# Patient Record
(demographics unavailable — no encounter records)

---

## 2024-11-15 NOTE — PHYSICAL EXAM
[de-identified] : The patient is a well appearing 17 year old male of their stated age. Patient ambulates with a normal gait.  Negative straight leg raise.   Surgical site: LEFT KNEE   Incision sites: Well healed, clean, dry, intact, without drainage, without erythema  Range of motion: 0-140 degrees   Motor Testin+/5 quadriceps strength   Stability Testing: Stable ligamentous examination    Swelling/Effusion: None    Tenderness to palpation: None   Provocative testing: -LM/AD   Right Calf: soft and nontender Left Calf: soft and nontender   Neurovascular Examination: Grossly intact, 2+ distal pulses Contralateral Extremity: Examination grossly benign   Assessment & Plan: The patient is approximately 12 weeks s/p left knee EUA open patellar tendon proximal debridement with distal patellar debridement and patellar tendon repair (DOS:24). The patient's post-op plan, protocol and activity modifications have been thoroughly discussed and the patient expressed understanding.  Continue physical therapy-working on strengthening. The patient will control pain as discussed & continue ice and elevation as needed. The patient otherwise may advance activity as discussed. The patient is cleared for upper body workouts in his strength class but no lower body or conditioning. Follow up 6 weeks.  The patient's current medication management of their orthopedic diagnosis was reviewed today:  (1) We discussed a comprehensive treatment plan that included possible pharmaceutical management involving the use of prescription strength medications including but not limited to options such as oral Naprosyn 500mg BID, once daily Meloxicam 15 mg, or 500-650 mg Tylenol versus over the counter oral medications and topical prescription NSAID Pennsaid vs over the counter Voltaren gel. Based on our extensive discussion, the patient declined prescription medication and will use over the counter Advil, Alleve, Voltaren Gel or Tylenol as directed.  (2) There is a moderate risk of morbidity with further treatment, especially from use of prescription strength medications and possible side effects of these medications which include upset stomach with oral medications, skin reactions to topical medications and cardiac/renal issues with long term use.  (3) I recommended that the patient follow-up with their medical physician to discuss any significant specific potential issues with long term medication use such as interactions with current medications or with exacerbation of underlying medical comorbidities.  (4) The benefits and risks associated with use of injectable, oral or topical, prescription and over the counter anti-inflammatory medications were discussed with the patient. The patient voiced understanding of the risks including but not limited to bleeding, stroke, kidney dysfunction, heart disease, and were referred to the black box warning label for further information.  IKorina attest that this documentation has been prepared under the direction and in the presence of Provider Dr. Nicholas Bone.  The documentation recorded by the scribe accurately reflects the service Poonam COLON PA-C, personally performed and the decisions made by me. The patient was seen by me under the direct supervision of Dr. Nicholas Bone.

## 2024-11-15 NOTE — PHYSICAL EXAM
[de-identified] : The patient is a well appearing 17 year old male of their stated age. Patient ambulates with a normal gait.  Negative straight leg raise.   Surgical site: LEFT KNEE   Incision sites: Well healed, clean, dry, intact, without drainage, without erythema  Range of motion: 0-140 degrees   Motor Testin+/5 quadriceps strength   Stability Testing: Stable ligamentous examination    Swelling/Effusion: None    Tenderness to palpation: None   Provocative testing: -LM/AD   Right Calf: soft and nontender Left Calf: soft and nontender   Neurovascular Examination: Grossly intact, 2+ distal pulses Contralateral Extremity: Examination grossly benign   Assessment & Plan: The patient is approximately 12 weeks s/p left knee EUA open patellar tendon proximal debridement with distal patellar debridement and patellar tendon repair (DOS:24). The patient's post-op plan, protocol and activity modifications have been thoroughly discussed and the patient expressed understanding.  Continue physical therapy-working on strengthening. The patient will control pain as discussed & continue ice and elevation as needed. The patient otherwise may advance activity as discussed. The patient is cleared for upper body workouts in his strength class but no lower body or conditioning. Follow up 6 weeks.  The patient's current medication management of their orthopedic diagnosis was reviewed today:  (1) We discussed a comprehensive treatment plan that included possible pharmaceutical management involving the use of prescription strength medications including but not limited to options such as oral Naprosyn 500mg BID, once daily Meloxicam 15 mg, or 500-650 mg Tylenol versus over the counter oral medications and topical prescription NSAID Pennsaid vs over the counter Voltaren gel. Based on our extensive discussion, the patient declined prescription medication and will use over the counter Advil, Alleve, Voltaren Gel or Tylenol as directed.  (2) There is a moderate risk of morbidity with further treatment, especially from use of prescription strength medications and possible side effects of these medications which include upset stomach with oral medications, skin reactions to topical medications and cardiac/renal issues with long term use.  (3) I recommended that the patient follow-up with their medical physician to discuss any significant specific potential issues with long term medication use such as interactions with current medications or with exacerbation of underlying medical comorbidities.  (4) The benefits and risks associated with use of injectable, oral or topical, prescription and over the counter anti-inflammatory medications were discussed with the patient. The patient voiced understanding of the risks including but not limited to bleeding, stroke, kidney dysfunction, heart disease, and were referred to the black box warning label for further information.  IKorina attest that this documentation has been prepared under the direction and in the presence of Provider Dr. Nicholas Bone.  The documentation recorded by the scribe accurately reflects the service Poonam COLON PA-C, personally performed and the decisions made by me. The patient was seen by me under the direct supervision of Dr. Nicholas Bone.

## 2024-11-15 NOTE — HISTORY OF PRESENT ILLNESS
[de-identified] : The patient is s/p left knee EUA open patellar tendon proximal debridement with distal patellar debridement and patellar tendon repair  Date of Surgery: 8/28/24 Pain:    At Rest: 0/10              With Activity:  1/10   Mechanism of injury: pt was playing football with his friends and stepped into a pot hole and felt immediate pain This is NOT a Work Related Injury being treated under Worker's Compensation.  This is NOT an athletic injury occurring associated with an interscholastic or organized sports team.  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 In PT 3x/week at  sports rehab. Seeing improvement with strength and ROM at PT.  Additional Information: None

## 2024-11-15 NOTE — HISTORY OF PRESENT ILLNESS
[de-identified] : The patient is s/p left knee EUA open patellar tendon proximal debridement with distal patellar debridement and patellar tendon repair  Date of Surgery: 8/28/24 Pain:    At Rest: 0/10              With Activity:  1/10   Mechanism of injury: pt was playing football with his friends and stepped into a pot hole and felt immediate pain This is NOT a Work Related Injury being treated under Worker's Compensation.  This is NOT an athletic injury occurring associated with an interscholastic or organized sports team.  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 In PT 3x/week at  sports rehab. Seeing improvement with strength and ROM at PT.  Additional Information: None

## 2025-01-06 NOTE — HISTORY OF PRESENT ILLNESS
[de-identified] : The patient is a 17 year old right hand dominant male who presents today for follow up left knee Date of Surgery: 8/28/24 Pain:    At Rest: 0/10              With Activity: 0/10   Mechanism of injury: pt was playing football with his friends and stepped into a pot hole and felt immediate pain This is NOT a Work Related Injury being treated under Worker's Compensation.  This is NOT an athletic injury occurring associated with an interscholastic or organized sports team.  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 He is seeing improvement in ROM and strength.  Additional Information: s/p left knee EUA open patellar tendon proximal debridement with distal patellar debridement and patellar tendon repair on 8/28/2024

## 2025-01-06 NOTE — IMAGING
[de-identified] : The patient is a well appearing 17 year old male of their stated age. Patient ambulates with a normal gait.  Negative straight leg raise.   Surgical site: Left knee    Incision sites: Well healed   Range of motion: 0-140 degrees   Motor Testin+/5 quadriceps strength   Stability Testing: Stable ligamentous examination    Swelling/Effusion: None    Tenderness to palpation: None   Provocative testing: -LM/AD   Right Calf: soft and nontender Left Calf: soft and nontender   Neurovascular Examination: Grossly intact, 2+ distal pulses Contralateral Extremity: Examination grossly benign   Assessment & Plan: The patient is approximately 4 months s/p left knee EUA open patellar tendon proximal debridement with distal patellar debridement and patellar tendon repair (DOS: 24). The patient's post-op plan, protocol and activity modifications have been thoroughly discussed and the patient expressed understanding.  Continue physical therapy-working on strengthening. He may continue to advance activity as tolerated, working on endurance and avoiding fatigue. He may return to gym and sports. The patient will control pain as discussed & continue ice and elevation as needed. The patient otherwise may advance activity as discussed. The patient will follow up on a PRN basis unless new symptoms arise.   The patient's current medication management of their orthopedic diagnosis was reviewed today: The patient declined and/or was contraindicated for the recommended prescription medication Naprosyn and will use over the counter Advil, Aleve, Voltaren Gel or Tylenol as directed. (1) We discussed a comprehensive treatment plan that included possible pharmaceutical management involving the use of prescription strength medications including but not limited to options such as oral Naprosyn 500mg BID, once daily Meloxicam 15 mg, or 500-650 mg Tylenol versus over the counter oral medications and topical prescription NSAID Pennsaid vs over the counter Voltaren gel.  Based on our extensive discussion, the patient declined prescription medication and will use over the counter Advil, Alleve, Voltaren Gel or Tylenol as directed. (2) There is a moderate risk of morbidity with further treatment, especially from use of prescription strength medications and possible side effects of these medications which include upset stomach with oral medications, skin reactions to topical medications and cardiac/renal issues with long term use. (3) I recommended that the patient follow-up with their medical physician to discuss any significant specific potential issues with long term medication use such as interactions with current medications or with exacerbation of underlying medical comorbidities. (4) The benefits and risks associated with use of injectable, oral or topical, prescription and over the counter anti-inflammatory medications were discussed with the patient. The patient voiced understanding of the risks including but not limited to bleeding, stroke, kidney dysfunction, heart disease, and were referred to the black box warning label for further information.   Bonita COLON attest that this documentation has been prepared under the direction and in the presence of Provider Dr. Nicholas Bone.  The documentation recorded by the scribe accurately reflects the services IDr. Nicholas, personally performed and the decisions made by me.

## 2025-01-06 NOTE — HISTORY OF PRESENT ILLNESS
[de-identified] : The patient is a 17 year old right hand dominant male who presents today for follow up left knee Date of Surgery: 8/28/24 Pain:    At Rest: 0/10              With Activity: 0/10   Mechanism of injury: pt was playing football with his friends and stepped into a pot hole and felt immediate pain This is NOT a Work Related Injury being treated under Worker's Compensation.  This is NOT an athletic injury occurring associated with an interscholastic or organized sports team.  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 He is seeing improvement in ROM and strength.  Additional Information: s/p left knee EUA open patellar tendon proximal debridement with distal patellar debridement and patellar tendon repair on 8/28/2024

## 2025-01-06 NOTE — IMAGING
[de-identified] : The patient is a well appearing 17 year old male of their stated age. Patient ambulates with a normal gait.  Negative straight leg raise.   Surgical site: Left knee    Incision sites: Well healed   Range of motion: 0-140 degrees   Motor Testin+/5 quadriceps strength   Stability Testing: Stable ligamentous examination    Swelling/Effusion: None    Tenderness to palpation: None   Provocative testing: -LM/AD   Right Calf: soft and nontender Left Calf: soft and nontender   Neurovascular Examination: Grossly intact, 2+ distal pulses Contralateral Extremity: Examination grossly benign   Assessment & Plan: The patient is approximately 4 months s/p left knee EUA open patellar tendon proximal debridement with distal patellar debridement and patellar tendon repair (DOS: 24). The patient's post-op plan, protocol and activity modifications have been thoroughly discussed and the patient expressed understanding.  Continue physical therapy-working on strengthening. He may continue to advance activity as tolerated, working on endurance and avoiding fatigue. He may return to gym and sports. The patient will control pain as discussed & continue ice and elevation as needed. The patient otherwise may advance activity as discussed. The patient will follow up on a PRN basis unless new symptoms arise.   The patient's current medication management of their orthopedic diagnosis was reviewed today: The patient declined and/or was contraindicated for the recommended prescription medication Naprosyn and will use over the counter Advil, Aleve, Voltaren Gel or Tylenol as directed. (1) We discussed a comprehensive treatment plan that included possible pharmaceutical management involving the use of prescription strength medications including but not limited to options such as oral Naprosyn 500mg BID, once daily Meloxicam 15 mg, or 500-650 mg Tylenol versus over the counter oral medications and topical prescription NSAID Pennsaid vs over the counter Voltaren gel.  Based on our extensive discussion, the patient declined prescription medication and will use over the counter Advil, Alleve, Voltaren Gel or Tylenol as directed. (2) There is a moderate risk of morbidity with further treatment, especially from use of prescription strength medications and possible side effects of these medications which include upset stomach with oral medications, skin reactions to topical medications and cardiac/renal issues with long term use. (3) I recommended that the patient follow-up with their medical physician to discuss any significant specific potential issues with long term medication use such as interactions with current medications or with exacerbation of underlying medical comorbidities. (4) The benefits and risks associated with use of injectable, oral or topical, prescription and over the counter anti-inflammatory medications were discussed with the patient. The patient voiced understanding of the risks including but not limited to bleeding, stroke, kidney dysfunction, heart disease, and were referred to the black box warning label for further information.   Bonita COLON attest that this documentation has been prepared under the direction and in the presence of Provider Dr. Nicholas Bone.  The documentation recorded by the scribe accurately reflects the services IDr. Nicholas, personally performed and the decisions made by me.

## 2025-05-20 NOTE — IMAGING
[de-identified] : The patient is a well appearing 17 year old male of their stated age. Patient ambulates with a normal gait.  Negative straight leg raise.   Surgical site: Left knee    Incision sites: Well healed   Range of motion: 0-140 degrees   Motor Testin/5 quadriceps strength   Stability Testing: Stable ligamentous examination    Swelling/Effusion: None    Tenderness to palpation: None   Provocative testing: -LM/AD   Right Calf: soft and nontender Left Calf: soft and nontender   Neurovascular Examination: Grossly intact, 2+ distal pulses Contralateral Extremity: Examination grossly benign   Assessment & Plan: The patient is approximately 9 months s/p left knee EUA open patellar tendon proximal debridement with distal patellar debridement and patellar tendon repair (DOS: 24). The patient's post-op plan, protocol and activity modifications have been thoroughly discussed and the patient expressed understanding.  The patient reports he has been having pain and discomfort and has been getting gradually worse. Discussed with patient that he has quadriceps weakness. He will get back into formal physical therapy, HEP and stretching.  Due to worsening pain and instability with mechanical symptoms, patient will obtain MRI Left knee to evaluate for possible s/p patella debridement and repair. In the interim, we reviewed appropriate use of OTC anti-inflammatories as needed for pain, inflammation, and discomfort. Modify activity as discussed. He may continue to advance activity as tolerated, working on endurance and avoiding fatigue. The patient will control pain as discussed & continue ice and elevation as needed. The patient otherwise may advance activity as discussed.  Follow up s/p MRI  The patient's current medication management of their orthopedic diagnosis was reviewed today: The patient declined and/or was contraindicated for the recommended prescription medication Naprosyn and will use over the counter Advil, Aleve, Voltaren Gel or Tylenol as directed. (1) We discussed a comprehensive treatment plan that included possible pharmaceutical management involving the use of prescription strength medications including but not limited to options such as oral Naprosyn 500mg BID, once daily Meloxicam 15 mg, or 500-650 mg Tylenol versus over the counter oral medications and topical prescription NSAID Pennsaid vs over the counter Voltaren gel.  Based on our extensive discussion, the patient declined prescription medication and will use over the counter Advil, Alleve, Voltaren Gel or Tylenol as directed. (2) There is a moderate risk of morbidity with further treatment, especially from use of prescription strength medications and possible side effects of these medications which include upset stomach with oral medications, skin reactions to topical medications and cardiac/renal issues with long term use. (3) I recommended that the patient follow-up with their medical physician to discuss any significant specific potential issues with long term medication use such as interactions with current medications or with exacerbation of underlying medical comorbidities. (4) The benefits and risks associated with use of injectable, oral or topical, prescription and over the counter anti-inflammatory medications were discussed with the patient. The patient voiced understanding of the risks including but not limited to bleeding, stroke, kidney dysfunction, heart disease, and were referred to the black box warning label for further information.   IKorina attest that this documentation has been prepared under the direction and in the presence of Provider Dr. Nicholas oBne.  The documentation recorded by the scribe accurately reflects the services IDr. Nicholas, personally performed and the decisions made by me.

## 2025-05-20 NOTE — IMAGING
[de-identified] : The patient is a well appearing 17 year old male of their stated age. Patient ambulates with a normal gait.  Negative straight leg raise.   Surgical site: Left knee    Incision sites: Well healed   Range of motion: 0-140 degrees   Motor Testin/5 quadriceps strength   Stability Testing: Stable ligamentous examination    Swelling/Effusion: None    Tenderness to palpation: None   Provocative testing: -LM/AD   Right Calf: soft and nontender Left Calf: soft and nontender   Neurovascular Examination: Grossly intact, 2+ distal pulses Contralateral Extremity: Examination grossly benign   Assessment & Plan: The patient is approximately 9 months s/p left knee EUA open patellar tendon proximal debridement with distal patellar debridement and patellar tendon repair (DOS: 24). The patient's post-op plan, protocol and activity modifications have been thoroughly discussed and the patient expressed understanding.  The patient reports he has been having pain and discomfort and has been getting gradually worse. Discussed with patient that he has quadriceps weakness. He will get back into formal physical therapy, HEP and stretching.  Due to worsening pain and instability with mechanical symptoms, patient will obtain MRI Left knee to evaluate for possible s/p patella debridement and repair. In the interim, we reviewed appropriate use of OTC anti-inflammatories as needed for pain, inflammation, and discomfort. Modify activity as discussed. He may continue to advance activity as tolerated, working on endurance and avoiding fatigue. The patient will control pain as discussed & continue ice and elevation as needed. The patient otherwise may advance activity as discussed.  Follow up s/p MRI  The patient's current medication management of their orthopedic diagnosis was reviewed today: The patient declined and/or was contraindicated for the recommended prescription medication Naprosyn and will use over the counter Advil, Aleve, Voltaren Gel or Tylenol as directed. (1) We discussed a comprehensive treatment plan that included possible pharmaceutical management involving the use of prescription strength medications including but not limited to options such as oral Naprosyn 500mg BID, once daily Meloxicam 15 mg, or 500-650 mg Tylenol versus over the counter oral medications and topical prescription NSAID Pennsaid vs over the counter Voltaren gel.  Based on our extensive discussion, the patient declined prescription medication and will use over the counter Advil, Alleve, Voltaren Gel or Tylenol as directed. (2) There is a moderate risk of morbidity with further treatment, especially from use of prescription strength medications and possible side effects of these medications which include upset stomach with oral medications, skin reactions to topical medications and cardiac/renal issues with long term use. (3) I recommended that the patient follow-up with their medical physician to discuss any significant specific potential issues with long term medication use such as interactions with current medications or with exacerbation of underlying medical comorbidities. (4) The benefits and risks associated with use of injectable, oral or topical, prescription and over the counter anti-inflammatory medications were discussed with the patient. The patient voiced understanding of the risks including but not limited to bleeding, stroke, kidney dysfunction, heart disease, and were referred to the black box warning label for further information.   IKorina attest that this documentation has been prepared under the direction and in the presence of Provider Dr. Nicholas Bone.  The documentation recorded by the scribe accurately reflects the services IDr. Nicholas, personally performed and the decisions made by me.

## 2025-05-20 NOTE — HISTORY OF PRESENT ILLNESS
[de-identified] : The patient is a 17 year old right hand dominant male who presents today for follow up left knee Date of Surgery: 8/28/24 Pain:    At Rest: 2/10              With Activity: 8/10   Mechanism of injury: pt was playing football with his friends and stepped into a pot hole and felt immediate pain This is NOT a Work Related Injury being treated under Worker's Compensation.  This is NOT an athletic injury occurring associated with an interscholastic or organized sports team.  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 When he tried to go back to lacrosse practice in feb/march he was unable to tolerate the pain/discomfort he was feeling. since he started feeling pain he reports that his symptoms are getting worse and they feel worse than prior to surgery.  Additional Information: s/p left knee EUA open patellar tendon proximal debridement with distal patellar debridement and patellar tendon repair on 8/28/2024

## 2025-05-20 NOTE — HISTORY OF PRESENT ILLNESS
[de-identified] : The patient is a 17 year old right hand dominant male who presents today for follow up left knee Date of Surgery: 8/28/24 Pain:    At Rest: 2/10              With Activity: 8/10   Mechanism of injury: pt was playing football with his friends and stepped into a pot hole and felt immediate pain This is NOT a Work Related Injury being treated under Worker's Compensation.  This is NOT an athletic injury occurring associated with an interscholastic or organized sports team.  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 When he tried to go back to lacrosse practice in feb/march he was unable to tolerate the pain/discomfort he was feeling. since he started feeling pain he reports that his symptoms are getting worse and they feel worse than prior to surgery.  Additional Information: s/p left knee EUA open patellar tendon proximal debridement with distal patellar debridement and patellar tendon repair on 8/28/2024

## 2025-07-21 NOTE — DISCUSSION/SUMMARY
[de-identified] :  We had a thorough discussion regarding the nature of his pain, the pathophysiology, as well as all treatment options. I discussed operative and non-operative treatment modalities.  At this time I recommended an MRI and a CT scan.  He may be a candidate for a realignment procedure.  All questions were answered and the patient verbalized understanding. The patient is in agreement with this treatment plan.  Patient will follow up in 6-8 wks for repeat clinical assessment

## 2025-07-21 NOTE — PHYSICAL EXAM
[de-identified] : General: Well appearing, no acute distress  Neurologic: A&Ox3, No focal deficits Head: NCAT without abrasions, lacerations, or ecchymosis to head, face, or scalp Eyes: No scleral icterus, no gross abnormalities Respiratory: Equal chest wall expansion bilaterally, no accessory muscle use Lymphatic: No lymphadenopathy palpated Skin: Warm and dry Psychiatric: Normal mood and affect  Examination of the bilateral knee reveals no significant effusion, erythema or ecchymosis.  These show patella brady on exam.  Slight pes planus bilaterally.  There are no leg length discrepancies appreciated. Examination is noted to be bilaterally as follows. The patient's range of motion is from 0-90 limited by pain with crepitus through the motion.  The patient has pain with patella mobility and apprehension.  The patient displays no tenderness to palpation in the medial and lateral joint lines. There is tenderness in the in the medial and lateral patella facet.  The patient has a 4.5 out of 5 strength resisted straight leg raising as well as knee flexion and extension.  The knee is stable to Lachman testing, as well as anterior and posterior drawer.  There is no valgus or varus instability. The patient has no pain with Radha or Grind Testing.  The calf and thigh are soft, supple and nontender.  The patient is grossly neurovascularly intact distally. There is no pain with range of motion of the bilateral hips. No pain with logrolling of either hip.   [de-identified] :  The following radiographs were ordered and read by me during this patient's visit. I reviewed each radiograph in detail with the patient and discussed the findings as highlighted below. 4 views of the L knee were obtained today that show patella brady.  [place] rianna index 1.42.  X-rays including multiple views of the left hip and pelvis taken today in the office are reviewed.  These show bilateral cam lesions consistent with ERENDIRA.

## 2025-07-21 NOTE — HISTORY OF PRESENT ILLNESS
[de-identified] :  CATIE is a 18 year male here today for pain in his L knee.  The patient is a recent graduate of [employer].  He was a .  He states about a year and a half ago he stepped in a pothole while playing recreational football with his friends.  He began experiencing anterior knee pain.  He was referred to physical therapy, and had an MRI which showed a partial patella tendon tear versus patellar tendinitis.  He got multiple opinions from different providers, and saw Dr. Bone.  Dr. Bone indicated him for surgical intervention.  He did a debridement and repair of his patella tendon.  He states since the surgery he feels worse.  He has rehab for better part of a year.  Localizes it anteriorly.  He has difficulty transcending stairs, squatting and kneeling.  He reports even pain with standing for extended periods of time.  He localizes it anteriorly on the patella tendon.

## 2025-07-21 NOTE — HISTORY OF PRESENT ILLNESS
[de-identified] :  CATIE is a 18 year male here today for pain in his L knee.  The patient is a recent graduate of [employer].  He was a .  He states about a year and a half ago he stepped in a pothole while playing recreational football with his friends.  He began experiencing anterior knee pain.  He was referred to physical therapy, and had an MRI which showed a partial patella tendon tear versus patellar tendinitis.  He got multiple opinions from different providers, and saw Dr. Bone.  Dr. Bone indicated him for surgical intervention.  He did a debridement and repair of his patella tendon.  He states since the surgery he feels worse.  He has rehab for better part of a year.  Localizes it anteriorly.  He has difficulty transcending stairs, squatting and kneeling.  He reports even pain with standing for extended periods of time.  He localizes it anteriorly on the patella tendon.

## 2025-07-21 NOTE — ADDENDUM
[FreeTextEntry1] :  Documented by Inderjit Giang acting as a scribe for Dr. Valadez on 07/21/2025. All medical record entries made by the Scribe were at my, Dr. Valadez's, direction and personally dictated by me on 07/21/2025. I have reviewed the chart and agree that the record accurately reflects my personal performance of the history, physical exam, procedure and imaging.

## 2025-07-21 NOTE — DISCUSSION/SUMMARY
[de-identified] :  We had a thorough discussion regarding the nature of his pain, the pathophysiology, as well as all treatment options. I discussed operative and non-operative treatment modalities.  At this time I recommended an MRI and a CT scan.  He may be a candidate for a realignment procedure.  All questions were answered and the patient verbalized understanding. The patient is in agreement with this treatment plan.  Patient will follow up in 6-8 wks for repeat clinical assessment

## 2025-07-21 NOTE — PHYSICAL EXAM
[de-identified] : General: Well appearing, no acute distress  Neurologic: A&Ox3, No focal deficits Head: NCAT without abrasions, lacerations, or ecchymosis to head, face, or scalp Eyes: No scleral icterus, no gross abnormalities Respiratory: Equal chest wall expansion bilaterally, no accessory muscle use Lymphatic: No lymphadenopathy palpated Skin: Warm and dry Psychiatric: Normal mood and affect  Examination of the bilateral knee reveals no significant effusion, erythema or ecchymosis.  These show patella brady on exam.  Slight pes planus bilaterally.  There are no leg length discrepancies appreciated. Examination is noted to be bilaterally as follows. The patient's range of motion is from 0-90 limited by pain with crepitus through the motion.  The patient has pain with patella mobility and apprehension.  The patient displays no tenderness to palpation in the medial and lateral joint lines. There is tenderness in the in the medial and lateral patella facet.  The patient has a 4.5 out of 5 strength resisted straight leg raising as well as knee flexion and extension.  The knee is stable to Lachman testing, as well as anterior and posterior drawer.  There is no valgus or varus instability. The patient has no pain with Radha or Grind Testing.  The calf and thigh are soft, supple and nontender.  The patient is grossly neurovascularly intact distally. There is no pain with range of motion of the bilateral hips. No pain with logrolling of either hip.   [de-identified] :  The following radiographs were ordered and read by me during this patient's visit. I reviewed each radiograph in detail with the patient and discussed the findings as highlighted below. 4 views of the L knee were obtained today that show patella brady.  Cornish rianna index 1.42.  X-rays including multiple views of the left hip and pelvis taken today in the office are reviewed.  These show bilateral cam lesions consistent with ERENDIRA.

## 2025-07-21 NOTE — ASSESSMENT
[FreeTextEntry1] : The patient reports no pain along the medial or lateral aspects of the knee. However, there is discomfort noted with direct compression over the patella. A few degrees of hyperextension are observed during range of motion assessment. The patient demonstrates a normal internal progression angle with slight valgus resistance noted on examination. Symptoms are most prominent during work-related activities, which appear to aggravate discomfort.